# Patient Record
Sex: MALE | Race: WHITE
[De-identification: names, ages, dates, MRNs, and addresses within clinical notes are randomized per-mention and may not be internally consistent; named-entity substitution may affect disease eponyms.]

---

## 2019-02-10 ENCOUNTER — HOSPITAL ENCOUNTER (INPATIENT)
Dept: HOSPITAL 12 - ER | Age: 59
LOS: 4 days | Discharge: HOME | DRG: 644 | End: 2019-02-14
Payer: COMMERCIAL

## 2019-02-10 VITALS — HEIGHT: 65 IN | WEIGHT: 175 LBS | BODY MASS INDEX: 29.16 KG/M2

## 2019-02-10 VITALS — DIASTOLIC BLOOD PRESSURE: 97 MMHG | SYSTOLIC BLOOD PRESSURE: 167 MMHG

## 2019-02-10 DIAGNOSIS — K64.8: ICD-10-CM

## 2019-02-10 DIAGNOSIS — I10: ICD-10-CM

## 2019-02-10 DIAGNOSIS — R16.1: ICD-10-CM

## 2019-02-10 DIAGNOSIS — D69.6: ICD-10-CM

## 2019-02-10 DIAGNOSIS — E11.9: ICD-10-CM

## 2019-02-10 DIAGNOSIS — N40.0: ICD-10-CM

## 2019-02-10 DIAGNOSIS — D12.5: ICD-10-CM

## 2019-02-10 DIAGNOSIS — K40.90: ICD-10-CM

## 2019-02-10 DIAGNOSIS — Q76.49: ICD-10-CM

## 2019-02-10 DIAGNOSIS — Z85.038: ICD-10-CM

## 2019-02-10 DIAGNOSIS — E05.20: Primary | ICD-10-CM

## 2019-02-10 DIAGNOSIS — K29.70: ICD-10-CM

## 2019-02-10 DIAGNOSIS — R59.1: ICD-10-CM

## 2019-02-10 DIAGNOSIS — R94.8: ICD-10-CM

## 2019-02-10 DIAGNOSIS — K44.9: ICD-10-CM

## 2019-02-10 DIAGNOSIS — K26.9: ICD-10-CM

## 2019-02-10 DIAGNOSIS — F17.210: ICD-10-CM

## 2019-02-10 DIAGNOSIS — K31.4: ICD-10-CM

## 2019-02-10 DIAGNOSIS — I70.0: ICD-10-CM

## 2019-02-10 DIAGNOSIS — J43.9: ICD-10-CM

## 2019-02-10 DIAGNOSIS — D50.9: ICD-10-CM

## 2019-02-10 DIAGNOSIS — E53.8: ICD-10-CM

## 2019-02-10 DIAGNOSIS — B96.81: ICD-10-CM

## 2019-02-10 DIAGNOSIS — K55.9: ICD-10-CM

## 2019-02-10 DIAGNOSIS — K25.9: ICD-10-CM

## 2019-02-10 LAB
ALP SERPL-CCNC: 122 U/L (ref 50–136)
ALT SERPL W/O P-5'-P-CCNC: 26 U/L (ref 16–63)
AST SERPL-CCNC: 14 U/L (ref 15–37)
BASOPHILS # BLD AUTO: 0 K/UL (ref 0–8)
BASOPHILS NFR BLD AUTO: 0.5 % (ref 0–2)
BILIRUB DIRECT SERPL-MCNC: 0.2 MG/DL (ref 0–0.2)
BILIRUB SERPL-MCNC: 0.7 MG/DL (ref 0.2–1)
BUN SERPL-MCNC: 9 MG/DL (ref 7–18)
CHLORIDE SERPL-SCNC: 101 MMOL/L (ref 98–107)
CO2 SERPL-SCNC: 27 MMOL/L (ref 21–32)
CREAT SERPL-MCNC: 0.9 MG/DL (ref 0.6–1.3)
EOSINOPHIL # BLD AUTO: 0.1 K/UL (ref 0–0.7)
EOSINOPHIL NFR BLD AUTO: 0.7 % (ref 0–7)
GLUCOSE SERPL-MCNC: 170 MG/DL (ref 74–106)
HCT VFR BLD AUTO: 48.7 % (ref 36.7–47.1)
HGB BLD-MCNC: 16.6 G/DL (ref 12.5–16.3)
LIPASE SERPL-CCNC: 90 U/L (ref 73–393)
LYMPHOCYTES # BLD AUTO: 1.3 K/UL (ref 20–40)
LYMPHOCYTES NFR BLD AUTO: 12.3 % (ref 20.5–51.5)
MCH RBC QN AUTO: 29.2 UUG (ref 23.8–33.4)
MCHC RBC AUTO-ENTMCNC: 34 G/DL (ref 32.5–36.3)
MCV RBC AUTO: 85.4 FL (ref 73–96.2)
MONOCYTES # BLD AUTO: 0.5 K/UL (ref 2–10)
MONOCYTES NFR BLD AUTO: 4.7 % (ref 0–11)
NEUTROPHILS # BLD AUTO: 8.7 K/UL (ref 1.8–8.9)
NEUTROPHILS NFR BLD AUTO: 81.8 % (ref 38.5–71.5)
PLATELET # BLD AUTO: 117 K/UL (ref 152–348)
POTASSIUM SERPL-SCNC: 3.6 MMOL/L (ref 3.5–5.1)
RBC # BLD AUTO: 5.7 MIL/UL (ref 4.06–5.63)
WBC # BLD AUTO: 10.6 K/UL (ref 3.6–10.2)
WS STN SPEC: 8.7 G/DL (ref 6.4–8.2)

## 2019-02-10 PROCEDURE — G0378 HOSPITAL OBSERVATION PER HR: HCPCS

## 2019-02-10 PROCEDURE — A4217 STERILE WATER/SALINE, 500 ML: HCPCS

## 2019-02-10 PROCEDURE — A4663 DIALYSIS BLOOD PRESSURE CUFF: HCPCS

## 2019-02-10 RX ADMIN — SODIUM CHLORIDE PRN MLS/HR: 0.9 INJECTION, SOLUTION INTRAVENOUS at 23:58

## 2019-02-10 NOTE — NUR
Dr. Negron on panel call with Margarette Melchor NP, accepted for tele admission, 
diagnosis abdominal pain.

## 2019-02-10 NOTE — NUR
ADMITTED PATIENT IN TELE UNIT UNDER THE CARE OF TISHA JACK NP, BELONGING LIST DONE, SON 
INTERPRET FOR THE PATIENT, PATIENT SPEAK Persian UNDERSTAND LITTLE ENGLISH. CONT TO 
MONITOR.

## 2019-02-11 VITALS — DIASTOLIC BLOOD PRESSURE: 80 MMHG | SYSTOLIC BLOOD PRESSURE: 126 MMHG

## 2019-02-11 VITALS — SYSTOLIC BLOOD PRESSURE: 128 MMHG | DIASTOLIC BLOOD PRESSURE: 68 MMHG

## 2019-02-11 VITALS — SYSTOLIC BLOOD PRESSURE: 136 MMHG | DIASTOLIC BLOOD PRESSURE: 85 MMHG

## 2019-02-11 VITALS — DIASTOLIC BLOOD PRESSURE: 92 MMHG | SYSTOLIC BLOOD PRESSURE: 152 MMHG

## 2019-02-11 VITALS — SYSTOLIC BLOOD PRESSURE: 129 MMHG | DIASTOLIC BLOOD PRESSURE: 79 MMHG

## 2019-02-11 LAB
AMORPH PHOS CRY URNS QL MICRO: (no result) /HPF
APPEARANCE UR: (no result)
BASOPHILS # BLD AUTO: 0.1 K/UL (ref 0–8)
BASOPHILS NFR BLD AUTO: 0.5 % (ref 0–2)
BILIRUB UR QL STRIP: NEGATIVE
BUN SERPL-MCNC: 9 MG/DL (ref 7–18)
CHLORIDE SERPL-SCNC: 102 MMOL/L (ref 98–107)
CHOLEST SERPL-MCNC: 146 MG/DL (ref ?–200)
CO2 SERPL-SCNC: 25 MMOL/L (ref 21–32)
COLOR UR: YELLOW
CREAT SERPL-MCNC: 0.8 MG/DL (ref 0.6–1.3)
DEPRECATED SQUAMOUS URNS QL MICRO: (no result) /HPF
EOSINOPHIL # BLD AUTO: 0.1 K/UL (ref 0–0.7)
EOSINOPHIL NFR BLD AUTO: 0.5 % (ref 0–7)
GLUCOSE SERPL-MCNC: 155 MG/DL (ref 74–106)
GLUCOSE UR STRIP-MCNC: NEGATIVE MG/DL
HCT VFR BLD AUTO: 45.6 % (ref 36.7–47.1)
HDLC SERPL-MCNC: 28 MG/DL (ref 40–60)
HGB BLD-MCNC: 15.4 G/DL (ref 12.5–16.3)
HGB UR QL STRIP: (no result)
KETONES UR STRIP-MCNC: (no result) MG/DL
LEUKOCYTE ESTERASE UR QL STRIP: NEGATIVE
LYMPHOCYTES # BLD AUTO: 1.4 K/UL (ref 20–40)
LYMPHOCYTES NFR BLD AUTO: 12.2 % (ref 20.5–51.5)
MAGNESIUM SERPL-MCNC: 1.7 MG/DL (ref 1.8–2.4)
MCH RBC QN AUTO: 28.9 UUG (ref 23.8–33.4)
MCHC RBC AUTO-ENTMCNC: 34 G/DL (ref 32.5–36.3)
MCV RBC AUTO: 85.8 FL (ref 73–96.2)
MONOCYTES # BLD AUTO: 0.9 K/UL (ref 2–10)
MONOCYTES NFR BLD AUTO: 7.5 % (ref 0–11)
MUCOUS THREADS URNS QL MICRO: (no result) /LPF
NEUTROPHILS # BLD AUTO: 9.1 K/UL (ref 1.8–8.9)
NEUTROPHILS NFR BLD AUTO: 79.3 % (ref 38.5–71.5)
NITRITE UR QL STRIP: NEGATIVE
PH UR STRIP: 8.5 [PH] (ref 5–8)
PHOSPHATE SERPL-MCNC: 3.2 MG/DL (ref 2.5–4.9)
PLATELET # BLD AUTO: 118 K/UL (ref 152–348)
POTASSIUM SERPL-SCNC: 3.6 MMOL/L (ref 3.5–5.1)
RBC # BLD AUTO: 5.31 MIL/UL (ref 4.06–5.63)
RBC #/AREA URNS HPF: (no result) /HPF (ref 0–3)
SP GR UR STRIP: 1.01 (ref 1–1.03)
TRIGL SERPL-MCNC: 115 MG/DL (ref 30–150)
UROBILINOGEN UR STRIP-MCNC: 0.2 E.U./DL
WBC # BLD AUTO: 11.5 K/UL (ref 3.6–10.2)
WBC #/AREA URNS HPF: (no result) /HPF
WBC #/AREA URNS HPF: (no result) /HPF (ref 0–3)

## 2019-02-11 RX ADMIN — AMLODIPINE BESYLATE SCH MG: 5 TABLET ORAL at 08:17

## 2019-02-11 RX ADMIN — MAGNESIUM SULFATE IN DEXTROSE SCH MLS/HR: 10 INJECTION, SOLUTION INTRAVENOUS at 14:15

## 2019-02-11 RX ADMIN — MAGNESIUM SULFATE IN DEXTROSE SCH MLS/HR: 10 INJECTION, SOLUTION INTRAVENOUS at 13:10

## 2019-02-11 RX ADMIN — SODIUM CHLORIDE PRN MLS/HR: 0.9 INJECTION, SOLUTION INTRAVENOUS at 12:04

## 2019-02-11 NOTE — NUR
RECEIVED PATIENT ON BED, NO ACUTE DISTRESS NOTED. ALERT AND ORIENTED X4 Irish SPEAKING 
BUT ABLE TO MAKE NEEDS KNOWN, NO COMPLAINTS OF ABD PAIN AT THIS TIME. STILL ON NPO. IV 
ACCESS ON LEFT HAND #20 RUNNING NS @ 75 CC/HR INFUSING WELL. COMFORT MEASURES PROVIDED. WILL 
CONTINUE TO MONITOR CLOSELY.

## 2019-02-11 NOTE — NUR
Rec'd pt in bed awake. A&O x3. Yakut speaking but able to relay simple needs. No s/s of 
acute distress noted. On RA. RFA 20g IV site in place and patent, infusing NS @ 100ml/hr and 
arnei well. Continent with BRP. No complaint of abdominal pain or chest pain during  previous 
shift. No episode of hypertension during previous shift per report. Pending oncology 
consult. Remains NPO. All safety precautions in place. Will continue to monitor.

## 2019-02-11 NOTE — NUR
PATIENT SLEPT MOST OF THE NIGHT, TELE MONITOR SINUS RHYTHM AT THIS TIME, NO SOB NO CHEST 
PAIN, NO FURTHER COMPLAIN OF ABDOMINAL PAIN, NO VOMITING NOTED, BP WAS ELEVATED EARLIER, 
MEDICATED EARLIER WITH APRESOLINE AS ORDERED WITH EFFECTIVE RESULTS. REMAINS NPO AT THIS 
TIME.

## 2019-02-11 NOTE — NUR
PATIENT NOTED WITH IV ACCESS ON THE LEFT HAND #22 PAINFUL WHEN FLUSHED, REINSERTED NEW IV 
SITE ON THE RIGHT FOREARM #20 INTACT AND PATENT, RESUMED IVF.

## 2019-02-12 VITALS — SYSTOLIC BLOOD PRESSURE: 123 MMHG | DIASTOLIC BLOOD PRESSURE: 77 MMHG

## 2019-02-12 VITALS — DIASTOLIC BLOOD PRESSURE: 68 MMHG | SYSTOLIC BLOOD PRESSURE: 124 MMHG

## 2019-02-12 VITALS — SYSTOLIC BLOOD PRESSURE: 151 MMHG | DIASTOLIC BLOOD PRESSURE: 61 MMHG

## 2019-02-12 VITALS — DIASTOLIC BLOOD PRESSURE: 66 MMHG | SYSTOLIC BLOOD PRESSURE: 120 MMHG

## 2019-02-12 LAB
BUN SERPL-MCNC: 13 MG/DL (ref 7–18)
CHLORIDE SERPL-SCNC: 102 MMOL/L (ref 98–107)
CO2 SERPL-SCNC: 24 MMOL/L (ref 21–32)
CREAT SERPL-MCNC: 0.9 MG/DL (ref 0.6–1.3)
GLUCOSE SERPL-MCNC: 114 MG/DL (ref 74–106)
LDH SERPL L TO P-CCNC: 231 U/L (ref 85–227)
MAGNESIUM SERPL-MCNC: 2.1 MG/DL (ref 1.8–2.4)
POTASSIUM SERPL-SCNC: 3.8 MMOL/L (ref 3.5–5.1)

## 2019-02-12 RX ADMIN — ACETYLCYSTEINE SCH MG: 200 INHALANT RESPIRATORY (INHALATION) at 20:39

## 2019-02-12 RX ADMIN — AMLODIPINE BESYLATE SCH MG: 5 TABLET ORAL at 08:30

## 2019-02-12 RX ADMIN — ACETYLCYSTEINE SCH MG: 200 INHALANT RESPIRATORY (INHALATION) at 15:07

## 2019-02-12 RX ADMIN — Medication SCH MG: at 20:39

## 2019-02-12 RX ADMIN — SODIUM CHLORIDE PRN MLS/HR: 0.9 INJECTION, SOLUTION INTRAVENOUS at 04:52

## 2019-02-12 RX ADMIN — CYANOCOBALAMIN SCH MCG: 1000 INJECTION, SOLUTION INTRAMUSCULAR at 11:25

## 2019-02-12 NOTE — NUR
Seen and examined by Dr Coe. Dr ordered, Albuterol and Atrovent q6h PRN, Breo Ellipta 
daily, and to start of full liquid diet.

## 2019-02-12 NOTE — NUR
Family at bedside gave patient coffee about 180cc, 1 cup. Instructed patient and family to 
maintain patient on NPO status until further orders.

## 2019-02-12 NOTE — NUR
RECEIVED PATIENT AWAKE, SITTING IN CHAIR AT BEDSIDE. PATIENT IS A/O X4. Georgian SPEAKING 
BUT ABLE TO MAKE SIMPLE NEEDS KNOWN. DENIES ANY PAIN OR DISCOMFORT. NO RESP. DISTRESS NOTED. 
VS WNL. H/L INTACT AND PATENT. CALL LIGHT IN REACH. ALL NEEDS ATTENDED. WILL CONTINUE TO 
MONITOR AND ASSESS.

## 2019-02-12 NOTE — NUR
Received patient, awake, alert x 4. No complaints of abdominal pain, no vomiting noted. 
Maintained NPO status. With G22 on left arm, patent and intact, running NS at 100cc/hr. Not 
in any form of distress.  Seen and examined by Dr Rodriguez. Dr ordered CT contrast of abdomen 
and pelvis with contrast. Secured consent, signed by patient.

## 2019-02-12 NOTE — NUR
Pt in bed asleep. No s/s of acute distress noted. New IV site to left hand without s/s of 
complications noted. All safety precautions in place. No episode of abdominal pain or n/v 
during shift. Will endorse to oncoming nurse.

## 2019-02-13 VITALS — SYSTOLIC BLOOD PRESSURE: 112 MMHG | DIASTOLIC BLOOD PRESSURE: 76 MMHG

## 2019-02-13 VITALS — SYSTOLIC BLOOD PRESSURE: 106 MMHG | DIASTOLIC BLOOD PRESSURE: 66 MMHG

## 2019-02-13 VITALS — DIASTOLIC BLOOD PRESSURE: 71 MMHG | SYSTOLIC BLOOD PRESSURE: 114 MMHG

## 2019-02-13 VITALS — DIASTOLIC BLOOD PRESSURE: 60 MMHG | SYSTOLIC BLOOD PRESSURE: 102 MMHG

## 2019-02-13 LAB
AFP-TM SERPL-MCNC: 2.9 NG/ML (ref 0–8.3)
ALP SERPL-CCNC: 88 U/L (ref 50–136)
ALT SERPL W/O P-5'-P-CCNC: 18 U/L (ref 16–63)
AST SERPL-CCNC: 13 U/L (ref 15–37)
BASOPHILS # BLD AUTO: 0.1 K/UL (ref 0–8)
BASOPHILS NFR BLD AUTO: 0.8 % (ref 0–2)
BILIRUB SERPL-MCNC: 0.8 MG/DL (ref 0.2–1)
BUN SERPL-MCNC: 14 MG/DL (ref 7–18)
CHLORIDE SERPL-SCNC: 101 MMOL/L (ref 98–107)
CO2 SERPL-SCNC: 26 MMOL/L (ref 21–32)
CREAT SERPL-MCNC: 0.9 MG/DL (ref 0.6–1.3)
EOSINOPHIL # BLD AUTO: 0.1 K/UL (ref 0–0.7)
EOSINOPHIL NFR BLD AUTO: 1.5 % (ref 0–7)
GLUCOSE SERPL-MCNC: 118 MG/DL (ref 74–106)
HCT VFR BLD AUTO: 42.4 % (ref 36.7–47.1)
HGB BLD-MCNC: 14.3 G/DL (ref 12.5–16.3)
LYMPHOCYTES # BLD AUTO: 1.9 K/UL (ref 20–40)
LYMPHOCYTES NFR BLD AUTO: 23.5 % (ref 20.5–51.5)
MAGNESIUM SERPL-MCNC: 2 MG/DL (ref 1.8–2.4)
MCH RBC QN AUTO: 28.9 UUG (ref 23.8–33.4)
MCHC RBC AUTO-ENTMCNC: 34 G/DL (ref 32.5–36.3)
MCV RBC AUTO: 85.6 FL (ref 73–96.2)
MONOCYTES # BLD AUTO: 0.8 K/UL (ref 2–10)
MONOCYTES NFR BLD AUTO: 9.7 % (ref 0–11)
NEUTROPHILS # BLD AUTO: 5.1 K/UL (ref 1.8–8.9)
NEUTROPHILS NFR BLD AUTO: 64.5 % (ref 38.5–71.5)
PHOSPHATE SERPL-MCNC: 3.6 MG/DL (ref 2.5–4.9)
PLATELET # BLD AUTO: 104 K/UL (ref 152–348)
POTASSIUM SERPL-SCNC: 3.7 MMOL/L (ref 3.5–5.1)
RBC # BLD AUTO: 4.96 MIL/UL (ref 4.06–5.63)
WBC # BLD AUTO: 7.9 K/UL (ref 3.6–10.2)
WS STN SPEC: 7.3 G/DL (ref 6.4–8.2)

## 2019-02-13 RX ADMIN — CYANOCOBALAMIN SCH MCG: 1000 INJECTION, SOLUTION INTRAMUSCULAR at 08:04

## 2019-02-13 RX ADMIN — Medication SCH MG: at 08:05

## 2019-02-13 RX ADMIN — FLUTICASONE FUROATE AND VILANTEROL TRIFENATATE SCH EACH: 100; 25 POWDER RESPIRATORY (INHALATION) at 08:19

## 2019-02-13 RX ADMIN — Medication SCH MG: at 20:30

## 2019-02-13 NOTE — NUR
RECEIVED PATIENT AWAKE, SITTING IN THE CHAIR INSIDE ROOM. NO SIGNS OF ACUTE DISTRESS. NO 
COMPLAINTS OF PAIN OR SOB. IV HEPLOCK ON THE LEFT HAND IS INTACT. SAFETY MEASURES INITIATED. 
WILL CONTINUE TO MONITOR.

## 2019-02-13 NOTE — NUR
PATIENT AWAKE IN BED. SLEPT WELL THROUGHOUT THE NIGHT. VSS. DENIES PAIN OR DISCOMFORT. 
DENIES SOB .NO RESP. DISTRESS NOTED. CALL LIGHT IN REACH. ALL NEEDS ATTENDED. WILL CONTINUE 
TO MONITOR AND ASSESS.

## 2019-02-14 VITALS — SYSTOLIC BLOOD PRESSURE: 106 MMHG | DIASTOLIC BLOOD PRESSURE: 67 MMHG

## 2019-02-14 VITALS — DIASTOLIC BLOOD PRESSURE: 81 MMHG | SYSTOLIC BLOOD PRESSURE: 133 MMHG

## 2019-02-14 VITALS — SYSTOLIC BLOOD PRESSURE: 102 MMHG | DIASTOLIC BLOOD PRESSURE: 77 MMHG

## 2019-02-14 LAB — HCG UR QL: NEGATIVE

## 2019-02-14 PROCEDURE — 0DBN8ZX EXCISION OF SIGMOID COLON, VIA NATURAL OR ARTIFICIAL OPENING ENDOSCOPIC, DIAGNOSTIC: ICD-10-PCS

## 2019-02-14 PROCEDURE — 0DBL8ZX EXCISION OF TRANSVERSE COLON, VIA NATURAL OR ARTIFICIAL OPENING ENDOSCOPIC, DIAGNOSTIC: ICD-10-PCS

## 2019-02-14 PROCEDURE — 0DB68ZX EXCISION OF STOMACH, VIA NATURAL OR ARTIFICIAL OPENING ENDOSCOPIC, DIAGNOSTIC: ICD-10-PCS

## 2019-02-14 RX ADMIN — FLUTICASONE FUROATE AND VILANTEROL TRIFENATATE SCH EACH: 100; 25 POWDER RESPIRATORY (INHALATION) at 10:28

## 2019-02-14 RX ADMIN — CYANOCOBALAMIN SCH MCG: 1000 INJECTION, SOLUTION INTRAMUSCULAR at 10:28

## 2019-02-14 RX ADMIN — Medication SCH MG: at 10:28

## 2019-02-14 NOTE — NUR
PATIENT SLEPT WELL THROUGHOUT THE NIGHT. PATIENT WAS ABLE TO DRINK MOST OF THE GOLYTELY AND 
FINISHED THE SORBITOL. EGD/COLONOSCOPY WILL BE DONE AT 8:30AM. NO SIGNS OF ACUTE DISTRESS. 
PATIENT HAD NO COMPLAINTS OF PAIN OR SOB. IV HEPLOCK ON THE LEFT HAND IS INTACT AND PATENT. 
PREOP CHECKLIST DONE.

## 2019-02-14 NOTE — NUR
d/c orders received noted and carried out,d/c heplock per md orders d/c instruction and 
education given to the pt,pt said he will follow up with his dr ,pt left the facility via 
private car in stable condition